# Patient Record
Sex: MALE | Race: WHITE | NOT HISPANIC OR LATINO | Employment: FULL TIME | URBAN - METROPOLITAN AREA
[De-identification: names, ages, dates, MRNs, and addresses within clinical notes are randomized per-mention and may not be internally consistent; named-entity substitution may affect disease eponyms.]

---

## 2018-03-27 ENCOUNTER — HISTORICAL (OUTPATIENT)
Dept: LAB | Facility: HOSPITAL | Age: 63
End: 2018-03-27

## 2018-03-27 LAB — PSA SERPL-MCNC: 0.25 NG/ML (ref 0–4)

## 2022-06-27 PROBLEM — D72.819 LEUKOPENIA: Status: ACTIVE | Noted: 2022-06-27

## 2022-06-27 PROBLEM — D69.6 THROMBOCYTOPENIA: Status: ACTIVE | Noted: 2022-06-27

## 2022-06-27 NOTE — PROGRESS NOTES
Subjective:       Patient ID: Darin Laurent is a 66 y.o. male.    Chief Complaint: Follow-up (No new concerns)      History of Present Illness  Referring provider: Dr. Gonzales  Reason for consult: Thrombocytopenia    Patient is a 66-year-old with a history of diabetes, hypertension hepatic steatosis who presented to clinic to establish care for a diagnosis of thrombocytopenia. Patient reports being aware of his thrombocytopenia for the last couple years, which has been fluctuating with progressive worsening over the last 2 weeks. He denies any symptoms of easy bruising, bleeding, fevers, chills, drenching night sweats, new lumps or bumps, decreased appetite or weight loss. Patient denies any symptoms of frequent infections. Patient reports being diagnosed with fatty liver 7 years back and was seen by a gastroenterologist then. He also reports a diagnosis of prostate cancer 10 years back, treated with EBRT.      Today 6/28/22: Patient here today for follow up visit. Patient denies any acute concerns. He denies any new medications, ER visits or hospital visits since his last follow-up with us. Patient denies any abnormal bruising or bleeding. He denies fevers, chills, night sweats, lumps or bumps,  poor appetite, weight loss, early satiety, recent infections or illnesses. No concerns reported.    Past medical history: Type 2 diabetes, hypertension, prostate cancer status post EBRT, fatty liver  Past surgical history: N/A  Social history: Denies smoking, alcohol use or recreational drug use. Works in a [employer] yard, [employer].  Family history: Family history of lung cancer in his mother and father, related to smoking. History of prostate cancer in his father.    Laboratory  6/21/22 Cr 0.79, Free T4 0.97, folic acid 7.01, B12 >2000, WBC 2.71, Hgb 13.1, MCV 90, PLT 76K, Retic 1.88%, ANC 0.88, smear- pancytopenia, no atypical cells/blasts are identified, no red cell fragments are noted  3/29/22 WBC 3.92, PLT 85K, ANC  1.83  1/27/2022: Glucose 174, creatinine 0.8, albumin 4.2, total bili 0.5, AST 39, ALT 44, WBC 2.9, hemoglobin 13.5, MCV 91, platelets 66, ANC 1.4.  1/18/2022: Creatinine 0.6, sodium 135, AST 59, ALT 87, total bili 0.8, WBC count 4, hemoglobin 13.8, MCV 95, platelet count 60. ANC 2.4.  2/15/2022: Creatinine 0.77, sodium 137, potassium 4, chloride 103, CO2 26, albumin 4.2, calcium 9.9, alk phos 63, total bili 0.7, AST 31, ALT 41, , TSH 1.98, iron 155, TIBC 329, percent saturation 46, ferritin 216, folic acid 8.5, vitamin B12 more than 2000, hepatitis B antigen nonreactive, HIV negative, hepatitis C antibody negative, WBC 3.55, hemoglobin 13.9, MCV 91, platelet count 90,000. Reticulocyte count 2.39%, ANC 1.44. SPEP/JOHNY and serum light chain assay within normal limits.    Pathology  2/16/2022: Peripheral smear review with mild thrombocytopenia, mild leukopenia and normochromic normocytic red blood cells.        Review of Systems  CONSTITUTIONAL: no fevers, no chills, no weight loss, no fatigue, no weakness  HEMATOLOGIC: no abnormal bleeding, no abnormal bruising, no drenching night sweats  ONCOLOGIC: no new masses or lumps  HEENT: no vision loss, no tinnitus or hearing loss, no nose bleeding, no dysphagia, no odynophagia  CVS: no chest pain, no palpitations, no dyspnea on exertion  RESP: no shortness of breath, no hemoptysis, no cough  GI: no nausea, no vomiting, no diarrhea, no constipation, no melena, no hematochezia, no hematemesis, no abdominal pain, no increase in abdominal girth  : no dysuria, no hematuria, no hesitancy, no scrotal swelling, no discharge  INTEGUMENT: no rashes, no abnormal bruising, no nail pitting, no hyperpigmentation  NEURO: no falls, no memory loss, no paresthesias or dysesthesias, no urofecal incontinence or retention, no loss of strength on any extremity  MSK: no back pain, no new joint pain, no joint swelling  PSYCH: no suicidal or homicidal ideation, no depression, no insomnia,  no anhedonia  ENDOCRINE: no heat or cold intolerance, no polyuria, no polydipsia        Objective:      Physical Exam  Vitals:    06/28/22 1333   BP: 132/81   Pulse: 67   Resp: 18   Temp: 98.1 °F (36.7 °C)        GA: AAOx3, NAD  HEENT: NCAT, PERRLA, EOMI, good dentition, no oral ulcers  LYMPH: no cervical, axillary or supraclavicular adenopathy  CVS: s1s2 RRR, no M/R/G  RESP: CTA b/l, no crackles, no wheezes or ronchi  ABD: soft, NT, ND, BS+, no hepatosplenomegaly  EXT: no deformities, no pedal edema  SKIN: no rashes, no bruises or purpura, warm and dry  NEURO: normal mentation, strength 5/5 on all 4 extremities, no sensory deficits    Assessment:       Problem List Items Addressed This Visit        Hematology    Thrombocytopenia - Primary       Oncology    Leukopenia      Other Visit Diagnoses     Prostate cancer          1. Thrombocytopenia D69.6   Noted patient's platelet count is 60,000 in January 2022, with associated mild leukopenia and anemia. Noted similar platelet count 2016.  Patient reports being on vitamin B12 repletion in the past as well as a diagnosis of hepatic steatosis 7 years back  Discussed with patient the causes of thrombocytopenia including nutritional deficiencies, liver disorders, alcohol use, hypothyroidism, splenomegaly, as well as primary bone marrow disorders  Discussed the high probability of underlying liver disease leading to thrombocytopenia in his case given his prior history of hepatic steatosis  Reviewed patient's repeat CBC with differential with unchanged platelet count from 2016, improved from January 2022 to 90,000. Noted normal vitamin B12, folic acid level, TSH, peripheral smear review, SPEP/JOHNY, serum light chain assay.  Noted ultrasound abdomen with mild hepatosplenomegaly without focal liver or spleen lesions.  Discussed splenomegaly and hepatic steatosis is likely pathology of patient's thrombocytopenia. Cannot rule out immune related thrombocytopenia however given  patient stable platelet count from 2016 without any interim acute events this is unlikely.      2. Leukopenia D72.819  Noted mild leukopenia as with thrombocytopenia and mild anemia  Noted normal vitamin B12 level, folic acid level, peripheral smear review.  Noted mild splenomegaly on ultrasound abdomen likely relate leukopenia.      3. Prostate cancer C61  Patient reports a history of early stage prostate cancer s/p EBRT 10 years back in 2020  Per patient report, his last PSA was less than 1  Noted most recent PSA at 1.2. Patient reports a follow-up scheduled with his urologist, Dr. Kinney, in a few weeks        Plan:       Most recent PLT count overall stable at 76K. Slight drop in WBC and ANC noted. He remains asymptomatic.  Discussed plan to trend his platelet count and his WBC count.  Discussed the plan to monitor his platelet counts every 3 months to rule out evolving primary bone marrow disorder such as MDS.  Patient is advised on the need to monitor for constitutional symptoms such as fevers, chills, drenching night sweats, new lumps or bumps, decreased appetite or weight loss.  Patient agrees with this plan of care and voiced understanding.  Counseled patient regarding decreasing high fat intake as well as exercising in order to reduce weight  Repeat CBC with differential, CMP, folic acid level, peripheral smear review and 3 months  Call if any questions or concerns  Cc Dr. Christian Cristobal, T[facility]-C

## 2022-06-28 ENCOUNTER — OFFICE VISIT (OUTPATIENT)
Dept: HEMATOLOGY/ONCOLOGY | Facility: CLINIC | Age: 67
End: 2022-06-28
Payer: COMMERCIAL

## 2022-06-28 VITALS
HEIGHT: 70 IN | SYSTOLIC BLOOD PRESSURE: 132 MMHG | WEIGHT: 221.88 LBS | TEMPERATURE: 98 F | RESPIRATION RATE: 18 BRPM | BODY MASS INDEX: 31.76 KG/M2 | HEART RATE: 67 BPM | DIASTOLIC BLOOD PRESSURE: 81 MMHG | OXYGEN SATURATION: 98 %

## 2022-06-28 DIAGNOSIS — C61 PROSTATE CANCER: ICD-10-CM

## 2022-06-28 DIAGNOSIS — D72.819 LEUKOPENIA, UNSPECIFIED TYPE: ICD-10-CM

## 2022-06-28 DIAGNOSIS — D69.6 THROMBOCYTOPENIA: Primary | ICD-10-CM

## 2022-06-28 PROCEDURE — 99213 PR OFFICE/OUTPT VISIT, EST, LEVL III, 20-29 MIN: ICD-10-PCS | Mod: ,,, | Performed by: NURSE PRACTITIONER

## 2022-06-28 PROCEDURE — 99213 OFFICE O/P EST LOW 20 MIN: CPT | Mod: ,,, | Performed by: NURSE PRACTITIONER

## 2022-06-28 RX ORDER — OLMESARTAN MEDOXOMIL AND HYDROCHLOROTHIAZIDE 20/12.5 20; 12.5 MG/1; MG/1
1 TABLET ORAL DAILY
COMMUNITY
Start: 2022-05-28

## 2022-06-28 RX ORDER — AMLODIPINE BESYLATE 5 MG/1
5 TABLET ORAL DAILY
COMMUNITY
Start: 2022-05-28

## 2022-06-28 RX ORDER — METFORMIN HYDROCHLORIDE 500 MG/1
500 TABLET ORAL 2 TIMES DAILY
COMMUNITY
Start: 2022-06-24